# Patient Record
Sex: MALE
[De-identification: names, ages, dates, MRNs, and addresses within clinical notes are randomized per-mention and may not be internally consistent; named-entity substitution may affect disease eponyms.]

---
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
## 2024-12-24 ENCOUNTER — HOSPITAL ENCOUNTER (EMERGENCY)
Dept: HOSPITAL 90 - EDH | Age: 24
Discharge: HOME | End: 2024-12-24
Payer: COMMERCIAL

## 2024-12-24 VITALS
RESPIRATION RATE: 18 BRPM | OXYGEN SATURATION: 98 % | SYSTOLIC BLOOD PRESSURE: 110 MMHG | DIASTOLIC BLOOD PRESSURE: 70 MMHG | TEMPERATURE: 98.6 F | HEART RATE: 78 BPM

## 2024-12-24 VITALS — BODY MASS INDEX: 30.66 KG/M2 | HEIGHT: 69 IN | WEIGHT: 207.01 LBS
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
## 2024-12-24 DIAGNOSIS — S63.426A: Primary | ICD-10-CM
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
## 2024-12-24 PROCEDURE — 29130 APPL FINGER SPLINT STATIC: CPT

## 2024-12-24 PROCEDURE — 73140 X-RAY EXAM OF FINGER(S): CPT

## 2024-12-24 PROCEDURE — 99283 EMERGENCY DEPT VISIT LOW MDM: CPT

## 2024-12-24 NOTE — ERN
ED Note


History of Present Illness


Stated Complaint:  C/O PAIN TO RIGHT LITTLE FINGER;


Chief Complaint:  Finger Injury


Time Seen by MD:  21:44


Dictation:


PATIENT IS A 24-YEAR-OLD MALE HERE WITH COMPLAINTS OF RIGHT 5TH FINGER PAIN WITH

ANGULATION AT THE D IP JOINT ONSET 1 HOUR PRIOR TO ARRIVAL.  HE STATES HE WAS 

WALKING AT HOME WENT TO  HIS NEPHEW, TRIPPED AND LANDED ON HIS EXTENDED 

RIGHT HAND.  HE STATES HE GRABBED IT AND PULLED IT BACK INTO PLACE.  REMAINS 

ANGULATED DISTALLY.  NEUROVASCULAR CMS INTACT SKIN IS INTACT.


Allergies:  


Coded Allergies:  


     No Known Allergies (Unverified  Allergy, Unknown, 12/24/24)





Past Medical History


Past Medical History:  No Pertinent History


Surgical History:  None


RN Note Reviewed/Agreed w/[facility]:  Yes





Review of System


Dictation


CONSTITUTIONAL:  NEGATIVE EXCEPT FOR HPI


HEAD/FACE:  NEGATIVE EXCEPT FOR HPI


EENT:  NEGATIVE EXCEPT FOR HPI


RESPIRATORY: NEGATIVE EXCEPT FOR HPI


GASTROINTESTINAL/ABDOMINAL:   NEGATIVE EXCEPT FOR HPI


GENITOURINARY: NEGATIVE EXCEPT FOR HPI


MUSCULOSKELETAL: NEGATIVE EXCEPT FOR HPI RIGHT 5TH FINGER PAIN DISTALLY


INTEGUMENTARY:  NEGATIVE EXCEPT FOR HPI


NEUROLOGICAL/PSYCH: NEGATIVE EXCEPT FOR HPI


HEMATOLOGIC/LYMPHATIC:  NEGATIVE EXCEPT FOR HPI





ALL SYSTEMS NEGATIVE, EXCEPT AS NOTED ABOVE.





13 POINT REVIEW OF SYSTEMS ASSESSED AND ALL NEGATIVE EXCEPT FOR ABOVE.





Initial Vital Sign


VS





                                   Vital Signs








  Date Time  Temp Pulse Resp B/P (MAP) Pulse Ox O2 Delivery O2 Flow Rate FiO2


 


12/24/24 21:23 99.0 89 20 133/79 97 Room Air  


 


12/24/24 21:56       0 21











Physical Exam


Dictation


VITAL SIGNS REVIEWED 


GENERAL APPEARANCE: ALERT, ORIENTED X 3, MILD ACUTE DISTRESS, WELL DEVELOPED, 

NOURISHED. 


HEAD AND FACE: NON-TRAUMATIC.


EYES: PERRL, PINK CONJUNCTIVAS, EYELID NO TRAUMA, ANTERIOR CHAMBER WITH ARCUS 

SENILIS. 


EARS: PINNAS INTACT AND NO SIGNS OF TRAUMA OR ERYTHEMA EAR CANALS CLEAR AND NO 

DISCHARGE TM NO ERYTHEMA 


NOSE: NO DISCHARGE, NO BLEEDING. 


OROPHARYNX: MOUTH NORMAL, TONGUE PINK, 


PHARYNX CLEAR,NO ERYTHEMA, TONSILS NO EXUDATES, NO ABSCESSES NOTED,  MUCOUS 

MEMBRANE MOIST 


NECK: SUPPLE, NON-TENDER, NO THYROMEGALY, NO MASSES, NO JVD, NO BRUITS 


BREAST:DEFERRED 


CHEST:NO TENDERNESS, NO CREPITUS, NO PARADOXICAL MOVEMENT, NO RETRACTIONS 


LUNGS:CLEAR, WELL-VENTILATED, SYMMETRIC, NO RALES, NO WHEEZING, NO RHONCHI, NO 

STRIDOR, GOOD BREATH SOUNDS BILATERALLY 


HEART: REGULAR RATE, REGULAR RHYTHM, NO MURMUR, NO GALLOPS 


VASCULAR: NO PERIPHERAL EDEMA, 


ABDOMEN: SOFT, POSITIVE BOWEL SOUNDS, NONDISTENDED, NO GUARDING, 


NONTENDER, NO REBOUND, NO MASSES NO HEPATOMEGALY, NO SPLENOMEGALY, NO PATEL'S 

SIGN, NO HERNIAS.


RECTAL: DEFERRED


GENITAL: DEFERRED


NEUROLOGICAL: NORMAL SPEECH,  MOTOR FUNCTION INTACT, SENSORY FUNCTION INTACT 


MUSCULOSKELETAL: NECK NONTENDER, FULL RANGE OF MOTION, BACK NONTENDER, FULL 

RANGE OF MOTION,


EXTREMITIES:  DECREASED RANGE OF MOTION WITH TENDERNESS TO RIGHT 5TH D IP JOINT.

 MILDLY ANGULATED


SKIN: COLOR PINK, DRY, NO TURGOR, NO RASH, NO LACERATIONS, NO ABRASIONS, NO 

CONTUSIONS.


LYMPHATIC: DEFERRED





Results (Laboratory/Radiology)


Laboratory/Radiology


FINGER(S) 2+VWS RT





CLINICAL HISTORY: INJURY 





COMPARISON: None 





TECHNIQUE: 2 images were obtained.





FINDINGS: There is mild subluxation of the DIP joint of the fifth digit


worrisome for underlying ligamentous injury. There is no identified bony


trauma.





IMPRESSION: Findings concerning for fifth digit ligamentous injury with


no identified bony trauma











Labs Reviewed?:  Yes





ED Course


ED Course





Orders








Procedure Category Date Status





  Time 


 


Finger(S) 2+Vws Rt RAD 12/24/24 Resulted





  21:26 


 


Ibuprofen 800 Mg Tab PHA 12/24/24 Complete





 (Motrin)  22:00 








Current Medications








 Medications


  (Trade)  Dose


 Ordered  Sig/Shreya


 Route


 PRN Reason  Start Time


 Stop Time Status Last Admin


Dose Admin


 


 Ibuprofen


  (moTRIN)  800 mg  ONCE  ONCE


 PO


   12/24/24 22:00


 12/24/24 22:01 DC 12/24/24 21:56











Vital Signs








  Date Time  Temp Pulse Resp B/P (MAP) Pulse Ox O2 Delivery O2 Flow Rate FiO2


 


12/24/24 21:56 98.8 82 18 126/72 98 Room Air* 0 21


 


12/24/24 21:23 99.0 89 20 133/79 97 Room Air  





02/20/2035, PATIENT PLACED IN DORSAL SPLINT TO KEEP HIS DISTAL INTERPHALANGEAL 

JOINT BY TECH.  DISTAL PHALANX WAS MILDLY HYPEREXTENDED DUE TO POSSIBLE


RUPTURED LIGAMENT INJURY.  EXPLAINED TO PATIENT THE NECESSITY TO FOLLOW UP WITH 

AN ORTHOPEDIC SURGEON IN THE NEXT SEVERAL DAYS AND MAINTAIN SPLINT.  

NEUROVASCULAR CMS INTACT POST PLACED





Medical Decision Making


MDM


MEDICAL DISCHARGE MAKING BASED ON X-RAY OF RIGHT LITTLE FINGER AND


SPLINT TO MAINTAIN DISTAL FINGER MILDLY HYPEREXTENDED


DISCHARGED HOME WITH IBUPROFEN AND INSTRUCTIONS TO FOLLOW UP WITH DR. MCKEON





DX & DISP


Disposition:  Discharge


Departure


Impression:  


   Primary Impression:  Rupture of ligament of finger of right hand


Condition:  Stable


Scripts


Ibuprofen (Ibuprofen 800 mg Tab) 800 Mg Tab


800 MG PO Q8H PRN for fever or pain, #30 TAB 0 Refills


   Prov: CHULA WALKER NP         12/24/24





Additional Instructions:  


FOLLOW-UP WITH PRIMARY CARE PROVIDER IN 1 TO 2 DAYS.  TAKE MEDICATIONS AS 

DIRECTED HERE IN THE EMERGENCY ROOM.  OKAY TO CONTINUE HOME MEDICATIONS UNLESS 

OTHERWISE DISCUSSED DURING YOUR VISIT IN THE EMERGENCY ROOM TODAY.  RETURN TO 

YOUR NEAREST EMERGENCY ROOM IF SYMPTOMS WORSEN OR IF THERE IS NO IMPROVEMENT.  

CALL 911 IF YOU NEED IMMEDIATE ASSISTANCE.  TAKE TYLENOL OR MOTRIN OVER-THE-

COUNTER AS NEEDED AND IF NO CONTRAINDICATIONS ARE PRESENT.  INCREASE ORAL 

HYDRATION.  A WOUND CULTURE OR URINE CULTURE WAS ORDERED HERE IN THE EMERGENCY 

ROOM DEPARTMENT PLEASE FOLLOW-UP WITH PRIMARY CARE PROVIDER AND ADVISE THEM TO 

GET REPEAT PORTS FROM OUR FACILITY.  IF YOU HAD ANY ACE WRAP/SPLINTS THAT WERE 

APPLIED HERE, PLEASE DO NOT REMOVE THEM UNTIL YOU SEE YOUR PRIMARY CARE OR 

SPECIALTY.





SPLINT AND NO WEIGHT-BEARING UNTIL CLEARED BY ORTHOPEDIC SURGEON, CALL FOR AN 

APPOINTMENT AS SOON AS POSSIBLE.  COOL COMPRESSES TO PAIN THREE TO 4 TIMES A 

DAY.


Referrals:  


RAMSES COHN MD (PCP)








ALEJANDRA HESS MD


Time of Disposition:  22:39


I have reviewed the case, and I agree with, Diagnosis and Plan











CHULA WALKER NP              Dec 24, 2024 21:49

## 2024-12-24 NOTE — HMCIM[facility]
FINGER(S) 2+VWS RT



CLINICAL HISTORY: INJURY 



COMPARISON: None 



TECHNIQUE: 2 images were obtained.



FINDINGS: There is mild subluxation of the DIP joint of the fifth digit

worrisome for underlying ligamentous injury. There is no identified bony

trauma.



IMPRESSION: Findings concerning for fifth digit ligamentous injury with

no identified bony trauma